# Patient Record
Sex: FEMALE | URBAN - METROPOLITAN AREA
[De-identification: names, ages, dates, MRNs, and addresses within clinical notes are randomized per-mention and may not be internally consistent; named-entity substitution may affect disease eponyms.]

---

## 2018-03-14 ENCOUNTER — APPOINTMENT (OUTPATIENT)
Dept: URBAN - METROPOLITAN AREA CLINIC 211 | Age: 63
Setting detail: DERMATOLOGY
End: 2018-03-15

## 2018-03-14 DIAGNOSIS — Z41.9 ENCOUNTER FOR PROCEDURE FOR PURPOSES OTHER THAN REMEDYING HEALTH STATE, UNSPECIFIED: ICD-10-CM

## 2018-03-14 PROCEDURE — OTHER OTHER: OTHER

## 2018-03-14 PROCEDURE — OTHER MIPS QUALITY: OTHER

## 2018-03-14 PROCEDURE — OTHER MEDICAL CONSULTATION: COOLSCULPTING: OTHER

## 2018-03-14 NOTE — PROCEDURE: OTHER
Detail Level: Zone
Other (Free Text): Pt has concern about her left outer thigh.  Pt states she had trauma in that area about 7-8 years ago.  And last year had an MRI that showed the area was fat.  After assessing the pt and palpating the area, it is squishy subcutaneous fat.  The following rec was made:\\n\\n1.  CoolSmooth Pro for the left outer thigh\\n\\nThe procedure was reviewed in detail and all questions and concerns were addressed.  Pt attended the Cool Event in [place].
Note Text (......Xxx Chief Complaint.): This diagnosis correlates with the

## 2018-03-14 NOTE — PROCEDURE: MIPS QUALITY
Quality 130: Documentation Of Current Medications In The Medical Record: Current Medications Documented
Detail Level: Detailed
Quality 110: Preventive Care And Screening: Influenza Immunization: Influenza Immunization not Administered for Documented Reasons.
Quality 431: Preventive Care And Screening: Unhealthy Alcohol Use - Screening: Patient screened for unhealthy alcohol use using a single question and scores less than 2 times per year
Quality 226: Preventive Care And Screening: Tobacco Use: Screening And Cessation Intervention: Patient screened for tobacco and never smoked

## 2018-03-20 ENCOUNTER — APPOINTMENT (OUTPATIENT)
Dept: URBAN - METROPOLITAN AREA CLINIC 211 | Age: 63
Setting detail: DERMATOLOGY
End: 2018-03-24

## 2018-03-20 DIAGNOSIS — Z41.9 ENCOUNTER FOR PROCEDURE FOR PURPOSES OTHER THAN REMEDYING HEALTH STATE, UNSPECIFIED: ICD-10-CM

## 2018-03-20 PROCEDURE — OTHER COOLSCULPTING: OTHER

## 2018-03-20 PROCEDURE — OTHER MIPS QUALITY: OTHER

## 2018-03-20 NOTE — PROCEDURE: COOLSCULPTING
Time (Minutes - Will Only Render If Nonzero): 0
Suction Settings: The suction settings were per protocol.
Intro: Prior to treatment, the area was cleaned with alcohol and marked out with a marking pen. The gel sheet was then applied uniformly. The applicator was applied to the skin with good contact and suction.
Location 1: outer thigh (left)
Device: Coolsculpting
Applicator Size: KIYATEC PRO applicator
Detail Level: Zone
Consent: Written consent obtained, risks reviewed including, but not limited to, blistering from suction, darker or lighter pigmentary change, bruising, and/or need for multiple treatments.
Patient Weight (Optional): 139.8 lbs without shoes
Post Treatment: After treatment, the suction was turned off, and the applicator was removed from the skin.  Immediately following the removal of the applicator the Jose D Z Wave was used to massage the treatment area per protocol.
Time (Minutes - Will Only Render If Nonzero): 75

## 2018-05-02 ENCOUNTER — APPOINTMENT (OUTPATIENT)
Dept: URBAN - METROPOLITAN AREA CLINIC 211 | Age: 63
Setting detail: DERMATOLOGY
End: 2018-05-03

## 2018-05-02 DIAGNOSIS — Z41.9 ENCOUNTER FOR PROCEDURE FOR PURPOSES OTHER THAN REMEDYING HEALTH STATE, UNSPECIFIED: ICD-10-CM

## 2018-05-02 PROCEDURE — OTHER MIPS QUALITY: OTHER

## 2018-05-02 PROCEDURE — OTHER COSMETIC FOLLOW-UP: OTHER

## 2018-05-02 NOTE — PROCEDURE: MIPS QUALITY
Detail Level: Detailed
Quality 130: Documentation Of Current Medications In The Medical Record: Current Medications Documented
Quality 431: Preventive Care And Screening: Unhealthy Alcohol Use - Screening: Patient screened for unhealthy alcohol use using a single question and scores less than 2 times per year
Quality 226: Preventive Care And Screening: Tobacco Use: Screening And Cessation Intervention: Patient screened for tobacco and never smoked
Quality 110: Preventive Care And Screening: Influenza Immunization: Influenza Immunization not Administered for Documented Reasons.

## 2018-05-02 NOTE — HPI: OTHER
Condition:: CoolSculpting follow up
Please Describe Your Condition:: Pt presents for her 30 day follow up on her left outer thigh.  Pt states she had itching and some tenderness in the area.  She also states she is noticing improvement.

## 2018-05-02 NOTE — PROCEDURE: COSMETIC FOLLOW-UP
Comments (Free Text): Pt weighs 137 today verses 139.8 on tx day.  After photos were taken and compared to the before photos.  There is improvement seen in the left outer thigh.  Reassured pt that she has an additional 60 days to see results.  Pt will rtc in 30 days for her 60 day follow up.
Detail Level: Zone
Global Improvement: Modest
Treatment Override (Free Text): CoolSculpting
Side Effects Or Complications: None